# Patient Record
Sex: FEMALE
[De-identification: names, ages, dates, MRNs, and addresses within clinical notes are randomized per-mention and may not be internally consistent; named-entity substitution may affect disease eponyms.]

---

## 2017-02-01 PROBLEM — E66.09 NON MORBID OBESITY DUE TO EXCESS CALORIES: Status: ACTIVE | Noted: 2017-02-01

## 2017-02-01 PROBLEM — E55.9 VITAMIN D DEFICIENCY: Status: ACTIVE | Noted: 2017-02-01

## 2017-02-01 PROBLEM — Z86.39 HX OF HASHIMOTO THYROIDITIS: Status: ACTIVE | Noted: 2017-02-01

## 2017-02-01 PROBLEM — M51.26 LUMBAR HERNIATED DISC: Status: ACTIVE | Noted: 2017-02-01

## 2017-02-01 PROBLEM — R73.03 PREDIABETES: Status: ACTIVE | Noted: 2017-02-01

## 2017-09-24 ENCOUNTER — DIAGNOSTIC TRANS (OUTPATIENT)
Dept: OTHER | Age: 37
End: 2017-09-24

## 2017-09-24 ENCOUNTER — HOSPITAL (OUTPATIENT)
Dept: OTHER | Age: 37
End: 2017-09-24
Attending: EMERGENCY MEDICINE

## 2017-09-24 LAB
ANALYZER ANC (IANC): NORMAL
ANION GAP SERPL CALC-SCNC: 14 MMOL/L (ref 10–20)
BASOPHILS # BLD: 0 THOUSAND/MCL (ref 0–0.3)
BASOPHILS NFR BLD: 0 %
BUN SERPL-MCNC: 14 MG/DL (ref 6–20)
BUN/CREAT SERPL: 18 (ref 7–25)
CALCIUM SERPL-MCNC: 8.5 MG/DL (ref 8.4–10.2)
CHLORIDE: 104 MMOL/L (ref 98–107)
CO2 SERPL-SCNC: 26 MMOL/L (ref 21–32)
CREAT SERPL-MCNC: 0.76 MG/DL (ref 0.51–0.95)
D DIMER PPP FEU-MCNC: 0.36 MG/L FEU
DIFFERENTIAL METHOD BLD: NORMAL
EOSINOPHIL # BLD: 0.3 THOUSAND/MCL (ref 0.1–0.5)
EOSINOPHIL NFR BLD: 4 %
ERYTHROCYTE [DISTWIDTH] IN BLOOD: 12.8 % (ref 11–15)
GLUCOSE SERPL-MCNC: 136 MG/DL (ref 65–99)
HEMATOCRIT: 38.6 % (ref 36–46.5)
HGB BLD-MCNC: 12.6 GM/DL (ref 12–15.5)
LYMPHOCYTES # BLD: 4.1 THOUSAND/MCL (ref 1–4.8)
LYMPHOCYTES NFR BLD: 44 %
MCH RBC QN AUTO: 30.1 PG (ref 26–34)
MCHC RBC AUTO-ENTMCNC: 32.6 GM/DL (ref 32–36.5)
MCV RBC AUTO: 92.3 FL (ref 78–100)
MONOCYTES # BLD: 0.5 THOUSAND/MCL (ref 0.3–0.9)
MONOCYTES NFR BLD: 6 %
NEUTROPHILS # BLD: 4.3 THOUSAND/MCL (ref 1.8–7.7)
NEUTROPHILS NFR BLD: 46 %
NEUTS SEG NFR BLD: NORMAL %
PERCENT NRBC: NORMAL
PLATELET # BLD: 297 THOUSAND/MCL (ref 140–450)
POTASSIUM SERPL-SCNC: 3.7 MMOL/L (ref 3.4–5.1)
RBC # BLD: 4.18 MILLION/MCL (ref 4–5.2)
SODIUM SERPL-SCNC: 140 MMOL/L (ref 135–145)
TROPONIN I SERPL HS-MCNC: <0.02 NG/ML
WBC # BLD: 9.3 THOUSAND/MCL (ref 4.2–11)

## 2017-10-02 PROBLEM — F41.9 ANXIETY: Status: ACTIVE | Noted: 2017-10-02

## 2017-10-02 PROBLEM — IMO0001 CLASS 3 OBESITY DUE TO EXCESS CALORIES WITHOUT SERIOUS COMORBIDITY WITH BODY MASS INDEX (BMI) OF 50.0 TO 59.9 IN ADULT: Status: ACTIVE | Noted: 2017-02-01

## 2017-10-02 PROBLEM — F43.10 PTSD (POST-TRAUMATIC STRESS DISORDER): Status: ACTIVE | Noted: 2017-10-02

## 2017-10-02 PROBLEM — F41.0 PANIC ATTACK: Status: ACTIVE | Noted: 2017-10-02

## 2017-10-02 PROBLEM — M75.42 ROTATOR CUFF IMPINGEMENT SYNDROME OF LEFT SHOULDER: Status: ACTIVE | Noted: 2017-10-02

## 2017-10-02 PROCEDURE — 82570 ASSAY OF URINE CREATININE: CPT | Performed by: INTERNAL MEDICINE

## 2017-10-02 PROCEDURE — 82746 ASSAY OF FOLIC ACID SERUM: CPT | Performed by: INTERNAL MEDICINE

## 2017-10-02 PROCEDURE — 86376 MICROSOMAL ANTIBODY EACH: CPT | Performed by: INTERNAL MEDICINE

## 2017-10-02 PROCEDURE — 83516 IMMUNOASSAY NONANTIBODY: CPT | Performed by: INTERNAL MEDICINE

## 2017-10-02 PROCEDURE — 84480 ASSAY TRIIODOTHYRONINE (T3): CPT | Performed by: INTERNAL MEDICINE

## 2017-10-02 PROCEDURE — 82043 UR ALBUMIN QUANTITATIVE: CPT | Performed by: INTERNAL MEDICINE

## 2017-10-02 PROCEDURE — 86800 THYROGLOBULIN ANTIBODY: CPT | Performed by: INTERNAL MEDICINE

## 2017-10-02 PROCEDURE — 82784 ASSAY IGA/IGD/IGG/IGM EACH: CPT | Performed by: INTERNAL MEDICINE

## 2017-10-02 PROCEDURE — 82607 VITAMIN B-12: CPT | Performed by: INTERNAL MEDICINE

## 2017-10-02 PROCEDURE — 86255 FLUORESCENT ANTIBODY SCREEN: CPT | Performed by: INTERNAL MEDICINE

## 2017-10-20 ENCOUNTER — HOSPITAL ENCOUNTER (OUTPATIENT)
Dept: CT IMAGING | Age: 37
Discharge: HOME | End: 2017-10-20
Attending: INTERNAL MEDICINE
Payer: MEDICAID

## 2017-10-20 DIAGNOSIS — R10.33 PERIUMBILICAL ABDOMINAL PAIN: ICD-10-CM

## 2017-10-20 PROCEDURE — 82565 ASSAY OF CREATININE: CPT

## 2017-10-20 PROCEDURE — 74160 CT ABDOMEN W/CONTRAST: CPT | Performed by: INTERNAL MEDICINE

## 2017-10-22 ENCOUNTER — HOSPITAL ENCOUNTER (EMERGENCY)
Facility: HOSPITAL | Age: 37
Discharge: HOME | End: 2017-10-22
Attending: PHYSICIAN ASSISTANT
Payer: MEDICAID

## 2017-10-22 ENCOUNTER — HOSPITAL ENCOUNTER (OUTPATIENT)
Age: 37
Discharge: OTHER INSTITUTION | End: 2017-10-22
Attending: EMERGENCY MEDICINE
Payer: MEDICAID

## 2017-10-22 ENCOUNTER — APPOINTMENT (OUTPATIENT)
Dept: GENERAL RADIOLOGY | Facility: HOSPITAL | Age: 37
End: 2017-10-22
Attending: PHYSICIAN ASSISTANT
Payer: MEDICAID

## 2017-10-22 VITALS
RESPIRATION RATE: 18 BRPM | HEART RATE: 82 BPM | SYSTOLIC BLOOD PRESSURE: 130 MMHG | HEIGHT: 65 IN | WEIGHT: 293 LBS | TEMPERATURE: 98 F | DIASTOLIC BLOOD PRESSURE: 74 MMHG | OXYGEN SATURATION: 98 % | BODY MASS INDEX: 48.82 KG/M2

## 2017-10-22 VITALS
HEART RATE: 91 BPM | DIASTOLIC BLOOD PRESSURE: 92 MMHG | TEMPERATURE: 99 F | WEIGHT: 293 LBS | HEIGHT: 65 IN | OXYGEN SATURATION: 98 % | RESPIRATION RATE: 20 BRPM | SYSTOLIC BLOOD PRESSURE: 140 MMHG | BODY MASS INDEX: 48.82 KG/M2

## 2017-10-22 DIAGNOSIS — J39.2 SWELLING OF THROAT: Primary | ICD-10-CM

## 2017-10-22 DIAGNOSIS — T78.40XA ALLERGIC REACTION, INITIAL ENCOUNTER: ICD-10-CM

## 2017-10-22 DIAGNOSIS — J39.2 SWELLING OF THROAT: ICD-10-CM

## 2017-10-22 DIAGNOSIS — R13.10 DYSPHAGIA, UNSPECIFIED TYPE: ICD-10-CM

## 2017-10-22 DIAGNOSIS — R06.02 SHORTNESS OF BREATH: Primary | ICD-10-CM

## 2017-10-22 PROCEDURE — 99283 EMERGENCY DEPT VISIT LOW MDM: CPT

## 2017-10-22 PROCEDURE — 71020 XR CHEST PA + LAT CHEST (CPT=71020): CPT | Performed by: PHYSICIAN ASSISTANT

## 2017-10-22 PROCEDURE — 87430 STREP A AG IA: CPT

## 2017-10-22 PROCEDURE — 99213 OFFICE O/P EST LOW 20 MIN: CPT

## 2017-10-22 RX ORDER — PREDNISONE 20 MG/1
60 TABLET ORAL ONCE
Status: COMPLETED | OUTPATIENT
Start: 2017-10-22 | End: 2017-10-22

## 2017-10-22 RX ORDER — DIPHENHYDRAMINE HCL 25 MG
50 CAPSULE ORAL ONCE
Status: COMPLETED | OUTPATIENT
Start: 2017-10-22 | End: 2017-10-22

## 2017-10-22 RX ORDER — PREDNISONE 20 MG/1
60 TABLET ORAL
Qty: 15 TABLET | Refills: 0 | Status: SHIPPED | OUTPATIENT
Start: 2017-10-22 | End: 2017-10-27

## 2017-10-22 RX ORDER — FAMOTIDINE 20 MG/1
20 TABLET ORAL 2 TIMES DAILY
Qty: 10 TABLET | Refills: 0 | Status: SHIPPED | OUTPATIENT
Start: 2017-10-22 | End: 2017-10-27

## 2017-10-22 RX ORDER — DIPHENHYDRAMINE HCL 25 MG
50 CAPSULE ORAL EVERY 6 HOURS
Qty: 40 CAPSULE | Refills: 0 | Status: SHIPPED | OUTPATIENT
Start: 2017-10-22 | End: 2017-10-27

## 2017-10-22 RX ORDER — FAMOTIDINE 20 MG/1
20 TABLET ORAL 2 TIMES DAILY
Status: DISCONTINUED | OUTPATIENT
Start: 2017-10-22 | End: 2017-10-22

## 2017-10-22 NOTE — ED INITIAL ASSESSMENT (HPI)
PATIENT ARRIVED AMBULATORY TO ROOM. PATIENT STATES \"I HAD A CT SCAN WITH CONTRAST DONE 2 DAYS AGO AND I THINK IM HAVING AN ALLERGIC REACTION TO THE DYE\" PATIENT C/O A SORE THROAT AND INTERMITTENT SOB. PATIENT HAS NEVER HAD CT SCAN WITH DYE BEFORE.  IGNACIO

## 2017-10-22 NOTE — ED PROVIDER NOTES
Patient Seen in: [facility] AND [facility] Emergency Department    History   Patient presents with:  Sore Throat    Stated Complaint: sore throat    HPI    22-year-old female presents with chief complaint of throat swelling. Onset 2 days ago.   Patient states sy Grandmother    • Diabetes Paternal Grandfather    • Thyroid Disorder Sister    • Colon Cancer Maternal Aunt    • Diabetes Paternal Aunt        Smoking status: Never Smoker                                                              Smokeless tobacco: Yogi Ortega edema. Gastrointestinal: Abdomen soft, nontender, nondistended. There is no rebound tenderness or guarding. No organomegaly is noted. No guarding or peritoneal signs. Genitourinary: Not examined. Lymphatic: No gross lymphadenopathy noted.   Musculoskel reaction, initial encounter    Disposition:  Discharge    Follow-up:  Catrina Hackett [address] [place] [phone]    Schedule an appointment as soon as possible for a visit in 2 days  For follow-up      Medications Pr

## 2017-10-22 NOTE — ED PROVIDER NOTES
Patient Seen in: [facility]    History   Patient presents with:  Breathing Problem    Stated Complaint: allergic reaction-SOB    HPI  Patient states she had IV and oral contrast for a CAT scan 2 days ago.   Several hours a reviewed from today and agreed except as otherwise stated in HPI.     Physical Exam   ED Triage Vitals [10/22/17 1224]  BP: 140/92  Pulse: 91  Resp: 20  Temp: 98.6 °F (37 °C)  Temp src: Oral  SpO2: 98 %  O2 Device: None (Room air)    Current:/92   Pul symptoms. Patient understands this. Patient is here with her  who will drive directly to the emergency department. Patient does not want an ambulance called at this time.         Disposition and Plan     Clinical Impression:  Shortness of breath

## 2017-10-24 NOTE — PROGRESS NOTES
Per HIPPA guidelines left detailed voice message re: recommendations/test results noted below by Dr. Cem Gasca. Instructed to call back with any questions or concerns [phone]  Instructed to please contact [phone], press option 2 to discuss.  Pt to susu

## 2017-11-16 PROBLEM — G44.209 TENSION HEADACHE: Status: ACTIVE | Noted: 2017-11-16

## 2017-11-27 PROBLEM — Z83.79 FAMILY HISTORY OF CELIAC DISEASE: Status: ACTIVE | Noted: 2017-11-27

## 2017-11-27 PROBLEM — K58.0 IRRITABLE BOWEL SYNDROME WITH DIARRHEA: Status: ACTIVE | Noted: 2017-11-27

## 2017-12-05 PROCEDURE — 82397 CHEMILUMINESCENT ASSAY: CPT | Performed by: INTERNAL MEDICINE

## 2017-12-05 PROCEDURE — 82533 TOTAL CORTISOL: CPT | Performed by: INTERNAL MEDICINE

## 2017-12-05 PROCEDURE — 36415 COLL VENOUS BLD VENIPUNCTURE: CPT | Performed by: INTERNAL MEDICINE

## 2018-06-21 ENCOUNTER — HOSPITAL (OUTPATIENT)
Dept: OTHER | Age: 38
End: 2018-06-21
Attending: EMERGENCY MEDICINE

## 2018-06-21 ENCOUNTER — DIAGNOSTIC TRANS (OUTPATIENT)
Dept: OTHER | Age: 38
End: 2018-06-21

## 2018-06-21 LAB
ANALYZER ANC (IANC): NORMAL
ANION GAP SERPL CALC-SCNC: 15 MMOL/L (ref 10–20)
BASOPHILS # BLD: 0 THOUSAND/MCL (ref 0–0.3)
BASOPHILS NFR BLD: 0 %
BUN SERPL-MCNC: 10 MG/DL (ref 6–20)
BUN/CREAT SERPL: 12 (ref 7–25)
CALCIUM SERPL-MCNC: 8.5 MG/DL (ref 8.4–10.2)
CHLORIDE: 104 MMOL/L (ref 98–107)
CO2 SERPL-SCNC: 27 MMOL/L (ref 21–32)
CREAT SERPL-MCNC: 0.83 MG/DL (ref 0.51–0.95)
D DIMER PPP FEU-MCNC: 0.69 MG/L FEU
DIFFERENTIAL METHOD BLD: NORMAL
EOSINOPHIL # BLD: 0.2 THOUSAND/MCL (ref 0.1–0.5)
EOSINOPHIL NFR BLD: 3 %
ERYTHROCYTE [DISTWIDTH] IN BLOOD: 13.1 % (ref 11–15)
GLUCOSE BLDC GLUCOMTR-MCNC: 129 MG/DL (ref 65–99)
GLUCOSE SERPL-MCNC: 116 MG/DL (ref 65–99)
HEMATOCRIT: 37.9 % (ref 36–46.5)
HGB BLD-MCNC: 12.8 GM/DL (ref 12–15.5)
LYMPHOCYTES # BLD: 3.2 THOUSAND/MCL (ref 1–4.8)
LYMPHOCYTES NFR BLD: 41 %
MCH RBC QN AUTO: 31 PG (ref 26–34)
MCHC RBC AUTO-ENTMCNC: 33.8 GM/DL (ref 32–36.5)
MCV RBC AUTO: 91.8 FL (ref 78–100)
MONOCYTES # BLD: 0.6 THOUSAND/MCL (ref 0.3–0.9)
MONOCYTES NFR BLD: 7 %
NEUTROPHILS # BLD: 3.9 THOUSAND/MCL (ref 1.8–7.7)
NEUTROPHILS NFR BLD: 49 %
NEUTS SEG NFR BLD: NORMAL %
NRBC (NRBCRE): NORMAL
PLATELET # BLD: 280 THOUSAND/MCL (ref 140–450)
POTASSIUM SERPL-SCNC: 3.8 MMOL/L (ref 3.4–5.1)
RBC # BLD: 4.13 MILLION/MCL (ref 4–5.2)
SODIUM SERPL-SCNC: 142 MMOL/L (ref 135–145)
TROPONIN I SERPL HS-MCNC: <0.02 NG/ML
WBC # BLD: 8 THOUSAND/MCL (ref 4.2–11)

## 2018-06-27 ENCOUNTER — HOSPITAL ENCOUNTER (EMERGENCY)
Facility: HOSPITAL | Age: 38
Discharge: HOME | End: 2018-06-27
Attending: EMERGENCY MEDICINE
Payer: MEDICAID

## 2018-06-27 ENCOUNTER — APPOINTMENT (OUTPATIENT)
Dept: GENERAL RADIOLOGY | Facility: HOSPITAL | Age: 38
End: 2018-06-27
Attending: EMERGENCY MEDICINE
Payer: MEDICAID

## 2018-06-27 VITALS
RESPIRATION RATE: 12 BRPM | TEMPERATURE: 98 F | DIASTOLIC BLOOD PRESSURE: 82 MMHG | BODY MASS INDEX: 48.82 KG/M2 | WEIGHT: 293 LBS | HEART RATE: 82 BPM | SYSTOLIC BLOOD PRESSURE: 154 MMHG | OXYGEN SATURATION: 98 % | HEIGHT: 65 IN

## 2018-06-27 DIAGNOSIS — R06.02 SHORTNESS OF BREATH: Primary | ICD-10-CM

## 2018-06-27 DIAGNOSIS — F41.9 ANXIETY: ICD-10-CM

## 2018-06-27 PROBLEM — I10 ESSENTIAL HYPERTENSION: Status: ACTIVE | Noted: 2018-06-27

## 2018-06-27 PROCEDURE — 80048 BASIC METABOLIC PNL TOTAL CA: CPT | Performed by: EMERGENCY MEDICINE

## 2018-06-27 PROCEDURE — 84484 ASSAY OF TROPONIN QUANT: CPT | Performed by: EMERGENCY MEDICINE

## 2018-06-27 PROCEDURE — 85025 COMPLETE CBC W/AUTO DIFF WBC: CPT

## 2018-06-27 PROCEDURE — 93005 ELECTROCARDIOGRAM TRACING: CPT

## 2018-06-27 PROCEDURE — 96374 THER/PROPH/DIAG INJ IV PUSH: CPT

## 2018-06-27 PROCEDURE — 93010 ELECTROCARDIOGRAM REPORT: CPT | Performed by: EMERGENCY MEDICINE

## 2018-06-27 PROCEDURE — 71045 X-RAY EXAM CHEST 1 VIEW: CPT | Performed by: EMERGENCY MEDICINE

## 2018-06-27 PROCEDURE — 99285 EMERGENCY DEPT VISIT HI MDM: CPT

## 2018-06-27 RX ORDER — LORAZEPAM 2 MG/ML
1 INJECTION INTRAMUSCULAR ONCE
Status: COMPLETED | OUTPATIENT
Start: 2018-06-27 | End: 2018-06-27

## 2018-06-27 RX ORDER — LORAZEPAM 2 MG/ML
INJECTION INTRAMUSCULAR
Status: COMPLETED
Start: 2018-06-27 | End: 2018-06-27

## 2018-06-27 NOTE — ED PROVIDER NOTES
Patient Seen in: [facility] Emergency Department    History   Patient presents with:  Dyspnea EMETERIO SOB (respiratory)      HPI    Patient presents to the ED complaining of increasing shortness of breath this evening.   She states that she feels like Smokeless tobacco: Never Used                        Alcohol use: Yes           0.0 oz/week       Comment: twice a year    Drug use:  No                ROS  Pertinent Positives: Chest tightness, shortness of breath  All other organ order CBC WITH DIFFERENTIAL WITH PLATELET.   Procedure                               Abnormality         Status                     ---------                               -----------         ------                     CBC W/ DIFFERENTIAL[[id number]] disease; and Irritable bowel syndrome with diarrhea on her problem list. to contribute to the complexity of this ED evaluation. ED Course: Presents to the ED with shortness of breath symptoms and chest tightness.   Appears anxious on examination, likely

## 2018-06-27 NOTE — ED INITIAL ASSESSMENT (HPI)
Increasing sob through day, worked up for last week at [address] [facility], gasping breathing

## 2018-07-10 ENCOUNTER — OFFICE VISIT (OUTPATIENT)
Dept: SLEEP CENTER | Facility: HOSPITAL | Age: 38
End: 2018-07-10
Attending: INTERNAL MEDICINE
Payer: MEDICAID

## 2018-07-10 DIAGNOSIS — G47.33 OSA (OBSTRUCTIVE SLEEP APNEA): ICD-10-CM

## 2018-07-10 PROCEDURE — 95810 POLYSOM 6/> YRS 4/> PARAM: CPT

## 2018-07-16 NOTE — PROCEDURES
[address] [address],[facility] [address]       Accredited by the [facility] of Sleep Medicine ([facility])    PATIENT'S NAME:        Sera Syed  ATTENDING PHYSICIAN:   Colin Mcfadden M.D.   REFERRING PHYSICIAN:     PATIENT ACCOUNT #:     12 normal amounts comprising 0.7% of sleep. Slow-wave sleep was seen in normal amounts comprising 21.9% of sleep. REM sleep was seen in decreased amounts comprising 12.9% of sleep.       RESPIRATORY MEASURES:  Respiratory monitoring showed obstructive apneas

## 2018-07-27 PROBLEM — G47.33 OSA (OBSTRUCTIVE SLEEP APNEA): Status: ACTIVE | Noted: 2018-07-27

## 2018-09-18 ENCOUNTER — OFFICE VISIT (OUTPATIENT)
Dept: SLEEP CENTER | Facility: HOSPITAL | Age: 38
End: 2018-09-18
Attending: INTERNAL MEDICINE
Payer: MEDICAID

## 2018-09-18 DIAGNOSIS — R53.83 FATIGUE, UNSPECIFIED TYPE: ICD-10-CM

## 2018-09-18 DIAGNOSIS — G47.33 OBSTRUCTIVE SLEEP APNEA (ADULT) (PEDIATRIC): ICD-10-CM

## 2018-09-18 PROCEDURE — 95811 POLYSOM 6/>YRS CPAP 4/> PARM: CPT

## 2018-09-25 NOTE — PROCEDURES
[address]       Accredited by the [address] [place] [address] of Sleep Medicine ([facility])    PATIENT'S NAME:        Aba Stevens  ATTENDING PHYSICIAN:   Lowell Luna M.D. REFERRING PHYSICIAN:   Lowell Luna M.D.   INDERJIT oxyhemoglobin saturation remained greater than  91%. ECG:  ECG demonstrated sinus rhythm throughout. PERIODIC LIMB MOVEMENTS:  Periodic limb movements were not observed.       EEG:  With the limited montage recorded, no EEG abnormalities were observ

## 2018-10-23 ENCOUNTER — LAB ENCOUNTER (OUTPATIENT)
Dept: LAB | Age: 38
End: 2018-10-23
Attending: INTERNAL MEDICINE
Payer: MEDICAID

## 2018-10-23 DIAGNOSIS — E55.9 VITAMIN D DEFICIENCY: ICD-10-CM

## 2018-10-23 DIAGNOSIS — R73.03 PREDIABETES: ICD-10-CM

## 2018-10-23 DIAGNOSIS — Z13.220 ENCOUNTER FOR SCREENING FOR LIPID DISORDER: ICD-10-CM

## 2018-10-23 DIAGNOSIS — Z13.0 SCREENING FOR ENDOCRINE, METABOLIC AND IMMUNITY DISORDER: ICD-10-CM

## 2018-10-23 DIAGNOSIS — Z13.29 ENCOUNTER FOR SCREENING FOR ENDOCRINE DISORDER: ICD-10-CM

## 2018-10-23 DIAGNOSIS — Z13.228 SCREENING FOR ENDOCRINE, METABOLIC AND IMMUNITY DISORDER: ICD-10-CM

## 2018-10-23 DIAGNOSIS — Z13.29 SCREENING FOR ENDOCRINE, METABOLIC AND IMMUNITY DISORDER: ICD-10-CM

## 2018-10-23 DIAGNOSIS — Z13.0 SCREENING FOR DISORDER OF BLOOD AND BLOOD-FORMING ORGANS: ICD-10-CM

## 2018-10-23 PROCEDURE — 83036 HEMOGLOBIN GLYCOSYLATED A1C: CPT

## 2018-10-23 PROCEDURE — 85027 COMPLETE CBC AUTOMATED: CPT

## 2018-10-23 PROCEDURE — 80061 LIPID PANEL: CPT

## 2018-10-23 PROCEDURE — 82306 VITAMIN D 25 HYDROXY: CPT

## 2018-10-23 PROCEDURE — 84443 ASSAY THYROID STIM HORMONE: CPT

## 2018-10-23 PROCEDURE — 36415 COLL VENOUS BLD VENIPUNCTURE: CPT

## 2018-10-23 PROCEDURE — 80053 COMPREHEN METABOLIC PANEL: CPT

## 2018-10-25 NOTE — PROGRESS NOTES
Noted. [facility]    Future Appointments  10/26/2018 9:45 AM    Farhad Flores MD       [facility]  11/7/2018  2:00 PM    Gabino Sarah MD          [facility]  11/26/2018 1:00 PM    ROOSEVELT Gonsalez

## 2018-10-26 PROBLEM — K58.0 IRRITABLE BOWEL SYNDROME WITH DIARRHEA: Status: RESOLVED | Noted: 2017-11-27 | Resolved: 2018-10-26

## 2018-10-26 PROBLEM — M77.8 TENDINITIS OF RIGHT SHOULDER: Status: ACTIVE | Noted: 2018-10-26

## 2018-10-26 PROBLEM — I10 ESSENTIAL HYPERTENSION: Status: RESOLVED | Noted: 2018-06-27 | Resolved: 2018-10-26

## 2018-10-26 PROBLEM — G44.209 TENSION HEADACHE: Status: RESOLVED | Noted: 2017-11-16 | Resolved: 2018-10-26

## 2018-10-26 PROBLEM — J38.3 VOCAL CORD DYSFUNCTION: Status: ACTIVE | Noted: 2018-10-26

## 2018-10-26 PROBLEM — E66.01 CLASS 3 SEVERE OBESITY DUE TO EXCESS CALORIES WITHOUT SERIOUS COMORBIDITY WITH BODY MASS INDEX (BMI) OF 50.0 TO 59.9 IN ADULT (HCC): Status: ACTIVE | Noted: 2017-02-01

## 2019-04-18 PROBLEM — F41.8 ANXIETY WITH DEPRESSION: Status: ACTIVE | Noted: 2017-10-02

## 2019-08-19 PROBLEM — M75.42 ROTATOR CUFF IMPINGEMENT SYNDROME OF LEFT SHOULDER: Status: RESOLVED | Noted: 2017-10-02 | Resolved: 2019-08-19

## 2019-08-19 PROBLEM — M77.8 TENDINITIS OF RIGHT SHOULDER: Status: RESOLVED | Noted: 2018-10-26 | Resolved: 2019-08-19

## 2019-09-05 ENCOUNTER — HOSPITAL ENCOUNTER (OUTPATIENT)
Age: 39
Discharge: HOME | End: 2019-09-05
Payer: MEDICAID

## 2019-09-05 VITALS
TEMPERATURE: 98 F | OXYGEN SATURATION: 97 % | SYSTOLIC BLOOD PRESSURE: 150 MMHG | HEART RATE: 78 BPM | WEIGHT: 293 LBS | RESPIRATION RATE: 18 BRPM | DIASTOLIC BLOOD PRESSURE: 86 MMHG | BODY MASS INDEX: 59 KG/M2

## 2019-09-05 DIAGNOSIS — J02.0 STREPTOCOCCAL SORE THROAT: Primary | ICD-10-CM

## 2019-09-05 LAB — S PYO AG THROAT QL: POSITIVE

## 2019-09-05 PROCEDURE — 99213 OFFICE O/P EST LOW 20 MIN: CPT

## 2019-09-05 PROCEDURE — 99214 OFFICE O/P EST MOD 30 MIN: CPT

## 2019-09-05 PROCEDURE — 87430 STREP A AG IA: CPT

## 2019-09-05 RX ORDER — AZITHROMYCIN 500 MG/1
500 TABLET, FILM COATED ORAL DAILY
Qty: 5 TABLET | Refills: 0 | Status: SHIPPED | OUTPATIENT
Start: 2019-09-05 | End: 2019-09-10

## 2019-09-05 NOTE — ED PROVIDER NOTES
Patient presents with:  Sore Throat      HPI:     Lester Shrestha is a 34year old female with a history of thyroid disease presents with a chief complaint of sore throat started this morning. No posterior neck pain or neck stiffness. No rashes.   Continues POCT Rapid Strep Once      POCT Rapid Strep      azithromycin 500 MG Oral Tab          Sig: Take 1 tablet (500 mg total) by mouth daily for 5 days.           Dispense:  5 tablet          Refill:  0      Labs performed this visit:  Recent Results (from the

## 2019-10-22 ENCOUNTER — LAB ENCOUNTER (OUTPATIENT)
Dept: LAB | Age: 39
End: 2019-10-22
Attending: INTERNAL MEDICINE
Payer: MEDICAID

## 2019-10-22 DIAGNOSIS — Z13.228 ENCOUNTER FOR SCREENING FOR METABOLIC DISORDER: ICD-10-CM

## 2019-10-22 DIAGNOSIS — Z86.39 HX OF HASHIMOTO THYROIDITIS: ICD-10-CM

## 2019-10-22 DIAGNOSIS — E55.9 VITAMIN D DEFICIENCY: ICD-10-CM

## 2019-10-22 DIAGNOSIS — Z13.220 ENCOUNTER FOR SCREENING FOR LIPID DISORDER: ICD-10-CM

## 2019-10-22 DIAGNOSIS — R73.03 PREDIABETES: ICD-10-CM

## 2019-10-22 PROCEDURE — 36415 COLL VENOUS BLD VENIPUNCTURE: CPT

## 2019-10-22 PROCEDURE — 82306 VITAMIN D 25 HYDROXY: CPT

## 2019-10-22 PROCEDURE — 84460 ALANINE AMINO (ALT) (SGPT): CPT

## 2019-10-22 PROCEDURE — 84443 ASSAY THYROID STIM HORMONE: CPT

## 2019-10-22 PROCEDURE — 80061 LIPID PANEL: CPT

## 2019-10-22 PROCEDURE — 80048 BASIC METABOLIC PNL TOTAL CA: CPT

## 2019-10-22 PROCEDURE — 83036 HEMOGLOBIN GLYCOSYLATED A1C: CPT

## 2019-10-25 NOTE — PROGRESS NOTES
Noted. [facility]    Future Appointments  10/28/2019 10:00 AM   Garrison Bryant MD       [facility]  11/7/2019  2:00 PM    Analisa Slade MD          [facility]  11/29/2019 8:00 AM    BENI Carrion

## 2019-11-01 PROBLEM — J38.3 VOCAL CORD DYSFUNCTION: Status: RESOLVED | Noted: 2018-10-26 | Resolved: 2019-11-01

## 2019-11-01 PROBLEM — F41.0 PANIC ATTACK: Status: RESOLVED | Noted: 2017-10-02 | Resolved: 2019-11-01

## 2019-11-01 PROBLEM — K90.41 GLUTEN-SENSITIVE ENTEROPATHY: Status: ACTIVE | Noted: 2019-11-01

## 2021-03-12 ENCOUNTER — HOSPITAL ENCOUNTER (OUTPATIENT)
Age: 41
Discharge: HOME | End: 2021-03-12
Attending: EMERGENCY MEDICINE
Payer: MEDICAID

## 2021-03-12 VITALS
DIASTOLIC BLOOD PRESSURE: 78 MMHG | SYSTOLIC BLOOD PRESSURE: 154 MMHG | HEART RATE: 83 BPM | RESPIRATION RATE: 20 BRPM | OXYGEN SATURATION: 100 % | TEMPERATURE: 98 F

## 2021-03-12 DIAGNOSIS — J32.0 RIGHT MAXILLARY SINUSITIS: ICD-10-CM

## 2021-03-12 DIAGNOSIS — H65.91 RIGHT OTITIS MEDIA WITH EFFUSION: Primary | ICD-10-CM

## 2021-03-12 LAB — B-HCG UR QL: NEGATIVE

## 2021-03-12 PROCEDURE — 81025 URINE PREGNANCY TEST: CPT

## 2021-03-12 PROCEDURE — 99213 OFFICE O/P EST LOW 20 MIN: CPT

## 2021-03-12 RX ORDER — AMOXICILLIN 875 MG/1
875 TABLET, COATED ORAL 2 TIMES DAILY
Qty: 20 TABLET | Refills: 0 | Status: SHIPPED | OUTPATIENT
Start: 2021-03-12 | End: 2021-03-22

## 2021-03-12 NOTE — ED INITIAL ASSESSMENT (HPI)
Sinus pressure and congestion with r ear pain since yesterday, no fever, denies concern for covid exposure

## 2021-03-12 NOTE — ED PROVIDER NOTES
Patient Seen in: [facility]      History   Patient presents with:  Sinus Problem    Stated Complaint: right ear pain, right side sinus pressure    HPI/Subjective:   HPI    The patient is a 18-year-old female with past history of Hashimoto's t 100%         Physical Exam    Constitutional: Well-developed well-nourished in no acute distress  Head: Normocephalic, no swelling or tenderness  Eyes: Nonicteric sclera, no conjunctival injection  ENT: Left TM is clear and flat.   The right TM is moderatel

## 2021-04-08 PROBLEM — E23.6 EMPTY SELLA (HCC): Status: ACTIVE | Noted: 2021-04-08

## 2021-04-08 PROBLEM — G43.009 MIGRAINE WITHOUT AURA AND WITHOUT STATUS MIGRAINOSUS, NOT INTRACTABLE: Status: ACTIVE | Noted: 2021-04-08

## 2021-05-25 ENCOUNTER — APPOINTMENT (OUTPATIENT)
Dept: GENERAL RADIOLOGY | Age: 41
End: 2021-05-25
Attending: EMERGENCY MEDICINE
Payer: MEDICAID

## 2021-05-25 ENCOUNTER — HOSPITAL ENCOUNTER (OUTPATIENT)
Age: 41
Discharge: HOME | End: 2021-05-25
Attending: EMERGENCY MEDICINE
Payer: MEDICAID

## 2021-05-25 VITALS
DIASTOLIC BLOOD PRESSURE: 75 MMHG | SYSTOLIC BLOOD PRESSURE: 161 MMHG | TEMPERATURE: 98 F | OXYGEN SATURATION: 98 % | HEART RATE: 88 BPM | RESPIRATION RATE: 20 BRPM

## 2021-05-25 DIAGNOSIS — S93.402A SPRAIN OF LEFT ANKLE, UNSPECIFIED LIGAMENT, INITIAL ENCOUNTER: Primary | ICD-10-CM

## 2021-05-25 DIAGNOSIS — S93.602A FOOT SPRAIN, LEFT, INITIAL ENCOUNTER: ICD-10-CM

## 2021-05-25 PROCEDURE — 73630 X-RAY EXAM OF FOOT: CPT | Performed by: EMERGENCY MEDICINE

## 2021-05-25 PROCEDURE — 99213 OFFICE O/P EST LOW 20 MIN: CPT

## 2021-05-25 PROCEDURE — 73610 X-RAY EXAM OF ANKLE: CPT | Performed by: EMERGENCY MEDICINE

## 2021-05-25 RX ORDER — IBUPROFEN 600 MG/1
600 TABLET ORAL ONCE
Status: COMPLETED | OUTPATIENT
Start: 2021-05-25 | End: 2021-05-25

## 2021-05-25 NOTE — ED PROVIDER NOTES
Patient Seen in: [facility]      History   Patient presents with:  Leg or Foot Injury    Stated Complaint: rolled left ankle going down stairs    HPI/Subjective:   HPI    The patient is a 39-year-old female with past history of celiac disease distress  Head: Normocephalic, no swelling or tenderness  Eyes: Nonicteric sclera, no conjunctival injection  Vascular: Palpable left posterior tibial pulse with good capillary refill to all toes  Neurologic: Patient is awake, alert and oriented ×3.   The p

## 2021-10-11 PROBLEM — E66.01 OBESITY, MORBID (HCC): Status: ACTIVE | Noted: 2021-10-11

## 2025-01-23 NOTE — PATIENT INSTRUCTIONS
-Push fluids and plenty of rest    -OTC cough medicine such as Mucinex DM or Robitussin DM (guaifenesin and dextromethorphan) as packet insert for dry and congested cough.    -OTC Tylenol/Ibuprofen as packet insert If no allergies  -Soothing cough drops as packet insert   -Good handwashing, to prevent spread of virus    -Face mask helps prevent viral infections    Follow up in 3-5 days for worsening symptoms with clinic or PCP

## 2025-01-23 NOTE — PROGRESS NOTES
CHIEF COMPLAINT:     Chief Complaint   Patient presents with    Flu     Cough, body aches, fever, congestion, diarrhea; started           HPI:   Michelle Mendoza is a 44 year old female who presents for upper respiratory symptoms for  4 days. Patient reports  cough .  Associated symptoms include fever at beginning, body aches, congestion, loose stools from OTC.   Denies sore throat.   Symptoms have been persistent since onset.  Treating symptoms with Nyquil and Dayquil.     No hx of COVID.  Influenza A exposure.      Current Outpatient Medications   Medication Sig Dispense Refill    amphetamine-dextroamphetamine 10 MG Oral Tab Take by mouth 2 (two) times daily.      Butalbital-APAP-Caffeine -40 MG Oral Tab Take 1 tablet by mouth every 6 (six) hours as needed for Pain or Headaches. 30 tablet 0    ergocalciferol 1.25 MG (80113 UT) Oral Cap Take 1 capsule (50,000 Units total) by mouth every 7 days. 12 capsule 0      Past Medical History:    Allergic rhinitis    Fatty liver    H/O Hashimoto thyroiditis    Lumbar herniated disc    Obesity    AMISH (obstructive sleep apnea)    AHI 7.5 REM AHI 33 Supine AHI 16.8 Sao2 [place] 73%    Prediabetes    Vitamin D deficiency      Past Surgical History:   Procedure Laterality Date    Appendectomy            Cholecystectomy      Colonoscopy      normal per pt         Social History     Socioeconomic History    Marital status:    Tobacco Use    Smoking status: Never    Smokeless tobacco: Never   Vaping Use    Vaping status: Never Used   Substance and Sexual Activity    Alcohol use: Yes     Alcohol/week: 0.0 standard drinks of alcohol     Comment: twice a year    Drug use: No     Social Drivers of Health     Physical Activity: High Risk (2024)    Received from [facility]    Exercise Vital Sign     On average, how many days per week do you engage in moderate to strenuous exercise (like a brisk walk)?: 0 days     On average, how many minutes  do you engage in exercise at this level?: 0 min         REVIEW OF SYSTEMS:   GENERAL: feels well otherwise,   OK appetite  SKIN: no rashes or abnormal skin lesions  HEENT: See HPI  LUNGS: denies shortness of breath or wheezing, See HPI  CARDIOVASCULAR: denies chest pain or palpitations   GI: denies abdominal pain  NEURO: Denies headaches    EXAM:   /70   Pulse 81   Temp 98 °F (36.7 °C) (Temporal)   Resp 18   Wt (!) 342 lb (155.1 kg)   SpO2 98%   BMI 58.70 kg/m²   GENERAL: well developed, well nourished, in no apparent distress  SKIN: no rashes,no suspicious lesions  HEAD: atraumatic, normocephalic.  No tenderness on palpation of sinuses  EYES: conjunctiva clear  EARS: TM's clear, no bulging, no retraction, no fluid, bony landmarks visualized  NOSE: Nostrils patent, clear nasal discharge, nasal mucosa pink and not inflamed  THROAT: Oral mucosa pink, moist. Posterior pharynx is not erythematous. No exudates. Tonsils 1/4.    NECK: Supple, non-tender  LUNGS: clear to auscultation bilaterally; good air movement.  Breathing is non labored. Congested cough.   CARDIO: RRR without murmur  EXTREMITIES: no cyanosis, clubbing or edema  LYMPH:  No cervical lymphadenopathy.        ASSESSMENT AND PLAN:   Michelle Mendoza is a 44 year old female who presents with     ASSESSMENT:   Encounter Diagnoses   Name Primary?    Influenza A Yes    Cough in adult        PLAN:   + influenza A  OTC Meds as listed in handout.  Risks, benefits, and side effects of medication explained and discussed.  Discussed viral etiology. Reviewed symptom relief measures with patient.   To f/u with PCP if sx do not resolve as anticipated.      Meds & Refills for this Visit:  Requested Prescriptions      No prescriptions requested or ordered in this encounter           Patient Instructions   -Push fluids and plenty of rest    -OTC cough medicine such as Mucinex DM or Robitussin DM (guaifenesin and dextromethorphan) as packet insert for dry and  congested cough.    -OTC Tylenol/Ibuprofen as packet insert If no allergies  -Soothing cough drops as packet insert   -Good handwashing, to prevent spread of virus    -Face mask helps prevent viral infections    Follow up in 3-5 days for worsening symptoms with clinic or PCP       The patient indicates understanding of these issues and agrees to the plan.

## (undated) NOTE — LETTER
Date: 1/23/2025    Patient Name: Michelle Mendoza          To Whom it may concern:    This letter has been written at the patient's request. The above patient was seen at [facility] for treatment of a medical condition.    This patient should be excused from attending work from 1/21/2025 through 1/23/2025.    The patient may return to work on 1/24/2025.          Sincerely,           VARUN OLIVA

## (undated) NOTE — ED AVS SNAPSHOT
Ms. Caterina Barroso   MRN: [id number]    Department:  [facility] Emergency Department   Date of Visit:  10/22/2017           Disclosure     Insurance plans vary and the physician(s) referred by the ER may not be covered by your plan.  Please conta CARE PHYSICIAN AT ONCE OR RETURN IMMEDIATELY TO THE EMERGENCY DEPARTMENT. If you have been prescribed any medication(s), please fill your prescription right away and begin taking the medication(s) as directed.   If you believe that any of the medications

## (undated) NOTE — ED AVS SNAPSHOT
Carmen Nash   MRN: [id number]    Department:  [facility] Emergency Department   Date of Visit:  6/27/2018           Disclosure     Insurance plans vary and the physician(s) referred by the ER may not be covered by your plan.  Please contact yo CARE PHYSICIAN AT ONCE OR RETURN IMMEDIATELY TO THE EMERGENCY DEPARTMENT. If you have been prescribed any medication(s), please fill your prescription right away and begin taking the medication(s) as directed.   If you believe that any of the medications